# Patient Record
Sex: FEMALE | Race: WHITE
[De-identification: names, ages, dates, MRNs, and addresses within clinical notes are randomized per-mention and may not be internally consistent; named-entity substitution may affect disease eponyms.]

---

## 2020-10-28 ENCOUNTER — HOSPITAL ENCOUNTER (OUTPATIENT)
Dept: HOSPITAL 76 - LAB.R | Age: 35
LOS: 61 days | Discharge: HOME | End: 2020-12-28
Attending: ADVANCED PRACTICE MIDWIFE
Payer: COMMERCIAL

## 2020-10-28 DIAGNOSIS — Z34.90: Primary | ICD-10-CM

## 2020-10-28 LAB
AMPHET UR QL SCN: NEGATIVE
BENZODIAZ UR QL SCN: NEGATIVE
CLARITY UR REFRACT.AUTO: CLEAR
COCAINE UR-SCNC: NEGATIVE UMOL/L
GLUCOSE UR QL STRIP.AUTO: NEGATIVE MG/DL
KETONES UR QL STRIP.AUTO: NEGATIVE MG/DL
METHADONE UR QL SCN: NEGATIVE
METHAMPHET UR QL SCN: NEGATIVE
NITRITE UR QL STRIP.AUTO: NEGATIVE
OPIATES UR QL SCN: NEGATIVE
PH UR STRIP.AUTO: 7.5 PH (ref 5–7.5)
PROT UR STRIP.AUTO-MCNC: NEGATIVE MG/DL
RBC # UR STRIP.AUTO: NEGATIVE /UL
SP GR UR STRIP.AUTO: 1.01 (ref 1–1.03)
SQUAMOUS URNS QL MICRO: (no result)
UROBILINOGEN UR QL STRIP.AUTO: (no result) E.U./DL
UROBILINOGEN UR STRIP.AUTO-MCNC: NEGATIVE MG/DL
VOLATILE DRUGS POS SERPL SCN: (no result)

## 2020-10-28 PROCEDURE — 80306 DRUG TEST PRSMV INSTRMNT: CPT

## 2020-10-28 PROCEDURE — 81001 URINALYSIS AUTO W/SCOPE: CPT

## 2020-10-28 PROCEDURE — 87086 URINE CULTURE/COLONY COUNT: CPT

## 2020-11-11 ENCOUNTER — HOSPITAL ENCOUNTER (OUTPATIENT)
Dept: HOSPITAL 76 - DI | Age: 35
Discharge: HOME | End: 2020-11-11
Attending: ADVANCED PRACTICE MIDWIFE
Payer: COMMERCIAL

## 2020-11-11 DIAGNOSIS — Z34.91: Primary | ICD-10-CM

## 2020-11-11 PROCEDURE — 76801 OB US < 14 WKS SINGLE FETUS: CPT

## 2020-11-11 NOTE — ULTRASOUND REPORT
PROCEDURE:  OB First Trimester

 

INDICATIONS:  SUPERVISION OF NORMALL PREGNANCY

 

OUTSIDE/PRIOR DATING DATA:  

Last menstrual period (LMP): 9/22/2020.  

LMP-based estimated date of delivery (YUE): 6/29/2021.  

First dating scan (date and location): 11/11/2020.  

Estimated date of delivery (YUE) from first dating scan: 6/27/2021.  

 

TECHNIQUE:  

Real-time scanning was performed of the fetus and maternal pelvic organs, with image documentation.  


 

COMPARISON:  None

 

FINDINGS:  There is a single living intrauterine gestation with fetal heart rate 152 bpm.

 

Embryo:  Crown-rump length 1.2 cm correlates with a gestational age estimate of 7 weeks 3 days, +/- 5
 days.

 

Measurement variability in dating:  +/- 4 weeks by LMP, +/- 7 days by mean sac diameter (use before 6
 weeks gestation if crown-rump length not able to be measured), +/- 5 days by crown-rump length (6-12
 weeks gestation).  

 

Maternal organs:  Ovaries normal considering gestational status.  Limited images through the kidneys 
demonstrate no hydronephrosis.  

 

IMPRESSION:  

7 week 3 day gestational age, with delivery date projected to be centered on 6/27/2021 from this stud
y. Follow-up fetal anatomic survey at 20 weeks gestation is recommended.

 

Reviewed by: Deo Adler MD on 11/11/2020 5:14 PM PST

Approved by: Deo Adler MD on 11/11/2020 5:14 PM PST

 

 

Station ID:  [id number]

## 2020-12-02 ENCOUNTER — HOSPITAL ENCOUNTER (OUTPATIENT)
Dept: HOSPITAL 76 - LAB | Age: 35
Discharge: HOME | End: 2020-12-02
Attending: ADVANCED PRACTICE MIDWIFE
Payer: COMMERCIAL

## 2020-12-02 DIAGNOSIS — Z34.90: Primary | ICD-10-CM

## 2020-12-02 DIAGNOSIS — Z36.89: ICD-10-CM

## 2020-12-02 LAB
BASOPHILS NFR BLD AUTO: 0.1 10^3/UL (ref 0–0.1)
BASOPHILS NFR BLD AUTO: 0.6 %
EOSINOPHIL # BLD AUTO: 0.4 10^3/UL (ref 0–0.7)
EOSINOPHIL NFR BLD AUTO: 3.4 %
ERYTHROCYTE [DISTWIDTH] IN BLOOD BY AUTOMATED COUNT: 12.2 % (ref 12–15)
HGB UR QL STRIP: 12.6 G/DL (ref 12–16)
LYMPHOCYTES # SPEC AUTO: 1.5 10^3/UL (ref 1.5–3.5)
LYMPHOCYTES NFR BLD AUTO: 14.2 %
MCH RBC QN AUTO: 30.8 PG (ref 27–31)
MCHC RBC AUTO-ENTMCNC: 33.7 G/DL (ref 32–36)
MCV RBC AUTO: 91.4 FL (ref 81–99)
MONOCYTES # BLD AUTO: 0.7 10^3/UL (ref 0–1)
MONOCYTES NFR BLD AUTO: 6.7 %
NEUTROPHILS # BLD AUTO: 7.7 10^3/UL (ref 1.5–6.6)
NEUTROPHILS # SNV AUTO: 10.3 X10^3/UL (ref 4.8–10.8)
NEUTROPHILS NFR BLD AUTO: 74.7 %
PDW BLD AUTO: 10.2 FL (ref 7.9–10.8)
PLATELET # BLD: 207 10^3/UL (ref 130–450)
RBC MAR: 4.09 10^6/UL (ref 4.2–5.4)

## 2020-12-02 PROCEDURE — 87389 HIV-1 AG W/HIV-1&-2 AB AG IA: CPT

## 2020-12-02 PROCEDURE — 86803 HEPATITIS C AB TEST: CPT

## 2020-12-02 PROCEDURE — 81599 UNLISTED MAAA: CPT

## 2020-12-02 PROCEDURE — 86787 VARICELLA-ZOSTER ANTIBODY: CPT

## 2020-12-02 PROCEDURE — 36415 COLL VENOUS BLD VENIPUNCTURE: CPT

## 2020-12-02 PROCEDURE — 86762 RUBELLA ANTIBODY: CPT

## 2020-12-02 PROCEDURE — 86850 RBC ANTIBODY SCREEN: CPT

## 2020-12-02 PROCEDURE — 85025 COMPLETE CBC W/AUTO DIFF WBC: CPT

## 2020-12-02 PROCEDURE — 86900 BLOOD TYPING SEROLOGIC ABO: CPT

## 2020-12-02 PROCEDURE — 87340 HEPATITIS B SURFACE AG IA: CPT

## 2020-12-02 PROCEDURE — 86901 BLOOD TYPING SEROLOGIC RH(D): CPT

## 2020-12-02 PROCEDURE — 86592 SYPHILIS TEST NON-TREP QUAL: CPT

## 2020-12-03 LAB
HEPATITIS B SURFACE ANTIGEN: (no result)
HEPATITIS C ANTIBODY: (no result)
HIV AG/AB 4TH GEN: (no result)
SIGNAL TO CUT-OFF: 0 (ref ?–1)

## 2021-01-08 ENCOUNTER — HOSPITAL ENCOUNTER (OUTPATIENT)
Dept: HOSPITAL 76 - LAB | Age: 36
Discharge: HOME | End: 2021-01-08
Attending: ADVANCED PRACTICE MIDWIFE
Payer: COMMERCIAL

## 2021-01-08 DIAGNOSIS — O09.519: Primary | ICD-10-CM

## 2021-01-08 PROCEDURE — 36415 COLL VENOUS BLD VENIPUNCTURE: CPT

## 2021-01-08 PROCEDURE — 82105 ALPHA-FETOPROTEIN SERUM: CPT

## 2021-02-01 ENCOUNTER — HOSPITAL ENCOUNTER (OUTPATIENT)
Dept: HOSPITAL 76 - LAB | Age: 36
Discharge: HOME | End: 2021-02-01
Attending: ADVANCED PRACTICE MIDWIFE
Payer: COMMERCIAL

## 2021-02-01 DIAGNOSIS — Z36.0: Primary | ICD-10-CM

## 2021-02-01 PROCEDURE — 82105 ALPHA-FETOPROTEIN SERUM: CPT

## 2021-02-01 PROCEDURE — 81599 UNLISTED MAAA: CPT

## 2021-02-10 ENCOUNTER — HOSPITAL ENCOUNTER (OUTPATIENT)
Dept: HOSPITAL 76 - DI | Age: 36
Discharge: HOME | End: 2021-02-10
Attending: ADVANCED PRACTICE MIDWIFE
Payer: COMMERCIAL

## 2021-02-10 DIAGNOSIS — Z36.0: ICD-10-CM

## 2021-02-10 DIAGNOSIS — Z34.92: Primary | ICD-10-CM

## 2021-02-15 NOTE — ULTRASOUND REPORT
PROCEDURE:  OB Detailed Fetal Eval

 

INDICATIONS:  ENC FOR  SCREENING

 

OUTSIDE/PRIOR DATING DATA:  

Last menstrual period (LMP): 2020.  

LMP-based estimated date of delivery (YUE): 2021.  

First dating scan (date and location): 2020.  

Estimated date of delivery (YUE) from first dating scan: 2021. Due date by provider is 2021
.  

 

TECHNIQUE: 

Real-time scanning was performed of the fetus, with image documentation and biometric measurements.  


 

COMPARISON:  OB ultrasound 2020

 

FINDINGS:     

 

General:  A single living intrauterine gestation is present.  

Presentation:  Third

Placenta:  Placental position is posterior, without previa.  

Amniotic fluid index:  16.3 cm,  within normal limits for gestational age.  Largest pocket 4.5 cm.

Fetal heart rate:  158 beats per minute.  

Maternal cervical canal:  3.6 cm long; normal length is 2.5 cm or more.  

 

Fetal biometrics:  

Biparietal diameter:  5.0 cm 21 weeks 1 day

Head circumference:  18.7 cm 21 weeks 0 days

Abdominal circumference:  15.9 cm 21 weeks 0 days

Femur length:  3.4 cm 20 weeks 5 days

Estimated gestational age from initial scan: 20 weeks 1 day

Composite gestational age from present scan:  20 weeks 6 days

Estimated fetal weight and percentile:  385 g, 85th percentile

Measurement variability in biometric dating: +/- 10 days from 12-20 weeks gestation, +/- 2 weeks from
 20-30 weeks gestation, +/- 3 weeks at 30 weeks gestation or later.  

 

Anatomic survey:  

Neuro:  Ventricles are normal at less than 10 mm.  Cisterna magna is normal at 3-11 mm.  Cerebellum i
s normal in size and morphology.  

Nuchal skin fold:  Normal at less than 6 mm between 14 and 20 weeks gestational age.  

Face:  Nose and lips, facial profile are normal.  

Spine:  No evidence for spina bifida.  

Heart:  4-chambered heart is present. Right ventricular outflow tract is suboptimally evaluated.

Diaphragm:  Diaphragm is intact.  

Stomach:  Left-sided stomach is present.  

Kidneys:  No fetal hydronephrosis.  Normal is less than 5 mm in 2nd trimester, less than 7 mm in 3rd 
trimester.  

Cord:  3 vessel cord has marginal placental cord insertion approximately 1.5 cm from the superior edg
e.

Bladder:  Normal in size.  

Extremities:  All 4 extremities are visualized.  

 

IMPRESSION:  

Single live intrauterine pregnancy as above.

 

Marginal placental cord insertion.

 

Suboptimal visualization of the right ventricular outflow track. Recommend interval follow-up for add
itional evaluation.

 

Reviewed by: Sarah Trevizo MD on 2/15/2021 4:54 PM PST

Approved by: Sarah Trevizo MD on 2/15/2021 4:54 PM PST

 

 

Station ID:  [id number]

## 2021-03-10 ENCOUNTER — HOSPITAL ENCOUNTER (OUTPATIENT)
Dept: HOSPITAL 76 - DI | Age: 36
Discharge: HOME | End: 2021-03-10
Attending: ADVANCED PRACTICE MIDWIFE
Payer: COMMERCIAL

## 2021-03-10 DIAGNOSIS — Z34.92: Primary | ICD-10-CM

## 2021-03-10 NOTE — ULTRASOUND REPORT
PROCEDURE:  OB F/U or Repeat

 

INDICATIONS:  SUPERVISION OF NORMAL PREGNANCY

 

OUTSIDE/PRIOR DATING DATA:  

Last menstrual period (LMP): 9/22/2020.  

LMP-based estimated date of delivery (YUE): 6/29/2021.  

First dating scan (date and location): 11/11/2020.  

Estimated date of delivery (YUE) from first dating scan: 6/27/2021.  

 

TECHNIQUE: 

Real-time scanning was performed of the fetus, with image documentation.  

Endovaginal scanning:  Not performed.

 

COMPARISON:  2/10/2021.

 

FINDINGS:  

 

General:  A single living intrauterine gestation is present.  

Presentation:  Cephalic.

Placenta:  Placental position is posterior, without previa. Cord insertion is marginal approximately 
1.4 cm from the placental edge. 

Amniotic fluid index:  18.4 cm cm, 80th percentile for gestational age.  Largest pocket 5 cm.

Fetal heart rate:  141 beats per minute.  

Maternal cervical canal:  4.4 cm long; normal length is 2.5 cm or more. No funneling. 

 

Estimated gestational age from initial scan: 24 weeks 1 day.  

Composite gestational age from present scan:  Not applicable.

 

Measurement variability in biometric dating: +/- 10 days from 12-20 weeks gestation, +/- 2 weeks from
 20-30 weeks gestation, +/- 3 weeks at 30 weeks gestation or more.  

 

Other: RVOT, LVOT, four-chamber heart views are normal.

 

IMPRESSION:  

1. [place] living intrauterine pregnancy at 24 weeks 1 day based on prior ultrasound. 

 

2. Marginal cord insertion on the placenta similar the prior exam. MARILYN 18.4 cm.

 

3. The fetal heart is now seen and normal in appearance.

 

Reviewed by: Oscar Philippe MD on 3/10/2021 5:38 PM PST

Approved by: Oscar Philippe MD on 3/10/2021 5:38 PM PST

 

 

Station ID:  [id number]

## 2021-03-31 ENCOUNTER — HOSPITAL ENCOUNTER (OUTPATIENT)
Dept: HOSPITAL 76 - LAB | Age: 36
Discharge: HOME | End: 2021-03-31
Attending: ADVANCED PRACTICE MIDWIFE
Payer: COMMERCIAL

## 2021-03-31 DIAGNOSIS — Z36.0: Primary | ICD-10-CM

## 2021-03-31 LAB
ERYTHROCYTE [DISTWIDTH] IN BLOOD BY AUTOMATED COUNT: 13.2 % (ref 12–15)
HCT VFR BLD AUTO: 31.9 % (ref 37–47)
HGB UR QL STRIP: 11 G/DL (ref 12–16)
MCH RBC QN AUTO: 32.4 PG (ref 27–31)
MCHC RBC AUTO-ENTMCNC: 34.5 G/DL (ref 32–36)
MCV RBC AUTO: 94.1 FL (ref 81–99)
NEUTROPHILS # SNV AUTO: 10.1 X10^3/UL (ref 4.8–10.8)
PDW BLD AUTO: 9.6 FL (ref 7.9–10.8)
PLATELET # BLD: 156 10^3/UL (ref 130–450)
RBC MAR: 3.39 10^6/UL (ref 4.2–5.4)

## 2021-03-31 PROCEDURE — 85027 COMPLETE CBC AUTOMATED: CPT

## 2021-03-31 PROCEDURE — 36415 COLL VENOUS BLD VENIPUNCTURE: CPT

## 2021-03-31 PROCEDURE — 82950 GLUCOSE TEST: CPT

## 2021-05-12 ENCOUNTER — HOSPITAL ENCOUNTER (OUTPATIENT)
Dept: HOSPITAL 76 - LAB | Age: 36
Discharge: HOME | End: 2021-05-12
Attending: ADVANCED PRACTICE MIDWIFE
Payer: COMMERCIAL

## 2021-05-12 DIAGNOSIS — O99.019: Primary | ICD-10-CM

## 2021-05-12 LAB
ERYTHROCYTE [DISTWIDTH] IN BLOOD BY AUTOMATED COUNT: 13.4 % (ref 12–15)
HCT VFR BLD AUTO: 33.7 % (ref 37–47)
HGB UR QL STRIP: 11.7 G/DL (ref 12–16)
MCH RBC QN AUTO: 32 PG (ref 27–31)
MCHC RBC AUTO-ENTMCNC: 34.7 G/DL (ref 32–36)
MCV RBC AUTO: 92.1 FL (ref 81–99)
NEUTROPHILS # SNV AUTO: 10.9 X10^3/UL (ref 4.8–10.8)
PDW BLD AUTO: 10.3 FL (ref 7.9–10.8)
PLATELET # BLD: 164 10^3/UL (ref 130–450)
RBC MAR: 3.66 10^6/UL (ref 4.2–5.4)

## 2021-05-12 PROCEDURE — 85027 COMPLETE CBC AUTOMATED: CPT

## 2021-05-12 PROCEDURE — 36415 COLL VENOUS BLD VENIPUNCTURE: CPT

## 2021-05-25 ENCOUNTER — HOSPITAL ENCOUNTER (OUTPATIENT)
Dept: HOSPITAL 76 - WFO | Age: 36
Discharge: HOME | End: 2021-05-25
Attending: ADVANCED PRACTICE MIDWIFE
Payer: COMMERCIAL

## 2021-05-25 VITALS — SYSTOLIC BLOOD PRESSURE: 128 MMHG | DIASTOLIC BLOOD PRESSURE: 87 MMHG

## 2021-05-25 DIAGNOSIS — O36.8330: Primary | ICD-10-CM

## 2021-05-25 DIAGNOSIS — Z3A.35: ICD-10-CM

## 2021-05-25 PROCEDURE — 59025 FETAL NON-STRESS TEST: CPT

## 2021-05-25 PROCEDURE — 99213 OFFICE O/P EST LOW 20 MIN: CPT

## 2021-05-25 NOTE — PROVIDER PROGRESS NOTE
- HPI


Chief Complaint: Other


Current Pregnancy: 


                                                               Current Pregnancy





EDU                              21


Gestation                        35 Weeks and 0 Days


                          1


Para                             0





Vital Signs











Temperature  31.7 C L  21 11:20


 


Heart Rate  87   21 11:20


 


Respiratory Rate  18   21 11:20


 


Blood Pressure  147/82 H  21 11:20


 


O2 Saturation  97   21 11:20




















Temperature  31.7 C L  21 11:20


 


Heart Rate  89   21 11:43


 


Respiratory Rate  18   21 11:43


 


Blood Pressure  128/87 H  21 11:43


 


O2 Saturation  97   21 11:20














- Procedures


OB Procedure Performed: NST


Diagnosis/Indication for NST: Other


NST Procedure: 


NST Procedure





Start Date                       21


Start Time                       11:17


Stop Time                        11:44


Vibroacoustic Stimulation Used   No


Patient States Fetal Movement    Yes











- Plan


Plan: 





Pt directed to present to [facility] from her routine office visit secondary to fetal

tachycardia noted on fetal doppler at the bedside in the office. 





NST performed 2021


NST read 2021


NST reactive. FHR baseline 145, moderate variability, + accels, no decels


No contractions appreciated via tocometry





Pt released home with precautions.


She verbalized understanding and agrees to above plan. She denies further 

questions or concerns at this time.





FINAL ASSESSMENT:


Fetal Tachycardia - resolved

## 2021-06-04 ENCOUNTER — HOSPITAL ENCOUNTER (OUTPATIENT)
Dept: HOSPITAL 76 - LAB.WC | Age: 36
Discharge: HOME | End: 2021-06-04
Attending: ADVANCED PRACTICE MIDWIFE
Payer: COMMERCIAL

## 2021-06-04 DIAGNOSIS — Z36.85: Primary | ICD-10-CM

## 2021-06-04 PROCEDURE — 87797 DETECT AGENT NOS DNA DIR: CPT

## 2021-07-07 ENCOUNTER — HOSPITAL ENCOUNTER (INPATIENT)
Dept: HOSPITAL 76 - WFO | Age: 36
LOS: 1 days | Discharge: HOME | End: 2021-07-08
Attending: ADVANCED PRACTICE MIDWIFE | Admitting: ADVANCED PRACTICE MIDWIFE
Payer: COMMERCIAL

## 2021-07-07 DIAGNOSIS — Z3A.41: ICD-10-CM

## 2021-07-07 DIAGNOSIS — O32.6XX0: ICD-10-CM

## 2021-07-07 LAB
BASOPHILS NFR BLD AUTO: 0.1 10^3/UL (ref 0–0.1)
BASOPHILS NFR BLD AUTO: 0.4 %
EOSINOPHIL # BLD AUTO: 0.1 10^3/UL (ref 0–0.7)
EOSINOPHIL NFR BLD AUTO: 0.5 %
ERYTHROCYTE [DISTWIDTH] IN BLOOD BY AUTOMATED COUNT: 13.2 % (ref 12–15)
HCT VFR BLD AUTO: 37.4 % (ref 37–47)
HGB UR QL STRIP: 13.1 G/DL (ref 12–16)
IGF BP1 VAG QL: POSITIVE
LYMPHOCYTES # SPEC AUTO: 1.5 10^3/UL (ref 1.5–3.5)
LYMPHOCYTES NFR BLD AUTO: 11.9 %
MCH RBC QN AUTO: 32 PG (ref 27–31)
MCHC RBC AUTO-ENTMCNC: 35 G/DL (ref 32–36)
MCV RBC AUTO: 91.2 FL (ref 81–99)
MONOCYTES # BLD AUTO: 0.7 10^3/UL (ref 0–1)
MONOCYTES NFR BLD AUTO: 5.7 %
NEUTROPHILS # BLD AUTO: 10.3 10^3/UL (ref 1.5–6.6)
NEUTROPHILS # SNV AUTO: 12.8 X10^3/UL (ref 4.8–10.8)
NEUTROPHILS NFR BLD AUTO: 80.7 %
NRBC # BLD AUTO: 0 /100WBC
NRBC # BLD AUTO: 0 X10^3/UL
PDW BLD AUTO: 10.6 FL (ref 7.9–10.8)
PLATELET # BLD: 206 10^3/UL (ref 130–450)
RBC MAR: 4.1 10^6/UL (ref 4.2–5.4)

## 2021-07-07 PROCEDURE — 86850 RBC ANTIBODY SCREEN: CPT

## 2021-07-07 PROCEDURE — 84112 EVAL AMNIOTIC FLUID PROTEIN: CPT

## 2021-07-07 PROCEDURE — 86900 BLOOD TYPING SEROLOGIC ABO: CPT

## 2021-07-07 PROCEDURE — 85025 COMPLETE CBC W/AUTO DIFF WBC: CPT

## 2021-07-07 PROCEDURE — 86901 BLOOD TYPING SEROLOGIC RH(D): CPT

## 2021-07-07 PROCEDURE — 0DQR0ZZ REPAIR ANAL SPHINCTER, OPEN APPROACH: ICD-10-PCS | Performed by: ADVANCED PRACTICE MIDWIFE

## 2021-07-07 PROCEDURE — 36415 COLL VENOUS BLD VENIPUNCTURE: CPT

## 2021-07-07 PROCEDURE — 99214 OFFICE O/P EST MOD 30 MIN: CPT

## 2021-07-07 RX ADMIN — ACETAMINOPHEN SCH MG: 500 TABLET ORAL at 17:26

## 2021-07-07 RX ADMIN — IBUPROFEN SCH MG: 600 TABLET, FILM COATED ORAL at 09:13

## 2021-07-07 RX ADMIN — ACETAMINOPHEN SCH MG: 500 TABLET ORAL at 09:13

## 2021-07-07 RX ADMIN — IBUPROFEN SCH MG: 600 TABLET, FILM COATED ORAL at 22:17

## 2021-07-07 RX ADMIN — DOCUSATE SODIUM SCH MG: 100 CAPSULE, LIQUID FILLED ORAL at 20:49

## 2021-07-07 RX ADMIN — DOCUSATE SODIUM SCH MG: 100 CAPSULE, LIQUID FILLED ORAL at 09:13

## 2021-07-07 RX ADMIN — IBUPROFEN SCH MG: 600 TABLET, FILM COATED ORAL at 16:03

## 2021-07-07 NOTE — HISTORY & PHYSICAL EXAMINATION
Prenatal Admit History





- Visit Reason


Visit Reason: Contractions





- Pregnancy


: 1


Parity: 0


Premature: 0


Ectopic: 0


: 0


Prenatal Risk/History: positive: None


Pregnancy Complications This Pregnancy: positive: None


Smoking Status: Never smoker





- Mother's Labs


Mother's Blood Type: positive: O


Mother's RH: positive: Positive


GBS: positive: Group B Step Negative


Rubella Status: positive: Immune





- Other Maternal History


Other Maternal History: 





-37yo  at 41.1wks gestation who presents to Labor and Delivery with reports 

of contractions since 230, which woke her up


-Reports fetal movement


-Denies ctx/VB/LOF


-Prenatal care with [facility] which has been adequate


-Pregnancy Complications


*Mild anemia


*Elderly primigravida


-Dating Criteria


* Initial US: at 7.1wks c/w LMP for YUE 2020:  


-OB Hx


*G1:current


-Medications


*Prenatal vitamin-daily


*Fe supplementation- 325mg daily


-Allergies


*Amoxicillin (critical)


-Medical History


*Anxiety


-Surgical History


*Jarbidge Teeth extraction


-Family History


*MGM- arthritis


*Father- HTN


Mother- MI <64yo


-Social History


*Non contributory





-Prenatal Labs, Immunizations, and Findings


Initial US: at 7.1wks c/w LMP for YUE 2020:  


O pos/Rubella  immune


VZV immune


Gentic testing: [place]- neg;  AFP - neg


FAS:  Posterior placenta. MARILYN wnl. EFW 85%. 3vc. Suboptimal views of outflow 

tracts, 


f/u FAS 03/10/2021 -WNL


Glucola 100


Flu:  10/28/2020


TDAP  2021


GBS at 36.2 weeks -Neg


HSV:  denies self and partner


Breast pump Rx 2021


MOD: . Anxiety about birth noted- books recommended; Desires "wait and see" 

approach to pain management; FOB: Yovani. It's a BOY! Ron


pp contraception: condoms


PAP: 2020- nilm, hpv neg, GC/CT neg





-SVE per RN : complete/plus one


-Vertex by digital exam


-EFW by leopold's approx. 8.5#


- Physical Exam: 


Normocephalic, atraumatic


Heart RRR w/o murmur


Lungs clear and equal bilaterally


Abdomen gravid, soft, nontender


Edema- none


Psych- wnl





-FHTs per flowsheet





-Assessment


*Restate STATUS


*Heaton Score *- requires cervical ripening


*Fetal Heart Tones- Category I





-Plan


*Admit to OBS(until active labor, SROM, AROM, epidural, or pitocin)


*Cervical ripening with Misoprostol


*Monitoring- Continuous


*Comfort measures available- position changes, whirlpool tub, fentanyl, and 

epidural per maternal preference


*Diet/Activity- per maternal preference


*Anticipate 





Meds/Allgy





- Allergies


Allergies/Adverse Reactions: 


                                    Allergies











Allergy/AdvReac Type Severity Reaction Status Date / Time


 


amoxicillin Allergy  Anaphylaxis Verified 21 05:43














Physical





- Abdominal Exam


Contraction Frequency (min/apart): 1-3min


Contraction Intensity: positive: Strong


Uterine Resting Tone: positive: Soft





- Fetal Monitoring


Fetal Heart Rate Baseline: 140


Fetal Strip Review: positive: Category I





- Presentation


Presentation: positive: Vertex





- Vaginal Exam


Dilation (in cm): 10





Plan for Labor





- Plan For Labor


I expect patient to be DC'd or transferred within 96 hours.: Yes

## 2021-07-07 NOTE — DELIVERY NOTE
<Val Casanova - Last Filed: 21 08:37>





Delivery Note





- Delivery Comments (Free Text/Narrative)


Delivery Comments (Free Text/Narrative): 














ADDENDUM: 


I was called in for consultation for complication perineal laceration repair by 

KIP Mccain. Patient was examined and found to have partial third

degree laceration. She was administered 25 cc of 1% lidocaine in divided doses 

through the course of the repair. A rectal exam was performed and the anal 

sphincter was noted to be lacerated to about 2/3 depth. Interrupted sutures 

using 2-0 Vicryl were used to reinforce the sphincter in the posterior, 

inferior, superior, and anterior positions. Rectal exam was performed and the 

bulk of the sphincter muscle was adequately developed and no sutures were 

palpated in the rectum. Interrupted suture again using 2-0 Vicryl was used to 

reapproximate the musculature overlying the rectovaginal septum. Rectal exam was

again performed, which showed absence of suture in the rectal vault and 

indicated satisfactory bulk over the rectovaginal septum. A crown stitch using 

3-0 Vicryl  was used to repair the floor of the vaginal vault and the perineum 

to the edge of the anal verge, performed in the usual sterile fashion in layers.

Final rectal exam was performed and again showed absence of suture in the rectal

vault. Good hemostasis was noted. 





Please refer to the delivery note above submitted by KIP Mccain,

for details regarding preceding delivery and EBL. 





Bee Casanova MD  21  08:30





<Orquidea Kearney - Last Filed: 21 14:20>





Delivery Note





- Labor


Labor: positive: Spontaneous





- Infant Delivery Method


Infant Delivery Method: positive: Spontaneous vaginal delivery





- Birth Presentation


Birth Presentation: positive: Vertex, NICHLELE - right occiput anterior (right hand 

compound)





- Nuchal Cord


Nuchal Cord: positive: None





- Amniotic Fluid Description


Amniotic Fluid Description: positive: Clear





- Episiotomy Type


Episiotomy Type: positive: None





- Laceration


Laceration: positive: 3rd degree





- Red Jacket


: positive: Placed in direct skin contact with mother, Stimulated, Blank

et used


Red Jacket sex: positive: Male





- Cord


Cord: positive: 3 vessels





- Placenta


Placenta: positive: Intact, Spontaneous





- Estimated Blood Loss


Estimated Blood Loss (in cc): 300





- Delivery Comments (Free Text/Narrative)


Delivery Comments (Free Text/Narrative): 


Birth Note:


Labor: This 36 year old, , @41.1wks gestation by 7.1 week Ultrasound, 

confirmed by LMP, presented @ 0505 in active labor. Cervix was complete, plus 

one, and vertex.  FHR pattern demonstrated a moderate baseline in a Category I 

pattern. Normal labor course. SROM occurred @ 0455 and amount and color of fluid

were noted to be moderate and clear. CNM to bedside by 0520 and pushing began. 

Forebag ruptured for clear at 0558. 


Birth: Normal  of a 4055gm male infant, named Ron, on 21 @ 0701. 

Nuchal not present, however baby did present in NICHELLE with a right hand compound 

presentation. The  was placed on maternal abdomen, stimulated, dried and 

placed skin to skin. Apgars 8 at one minute and 9 at five minutes. The umbilical

cord was allowed to stop pulsating at which time it was doubly clamped by CNM 

and cut by Valery. Pitocin administered via IV for hemostasis. Fundal massage 

and gentle cord traction applied for active third stage management. Cord blood 

was obtained. Placenta delivered spontaneously and intact at 0713. Three vessel 

cord. EBL 300mL.


Fourth Stage:  Uterine fundus firm and without excessive bleeding. The perineum,

vagina, and cervix were inspected and found to be have sustained a partial third

degree laceration. MD backup called to bedside for assistance with repair. 

Please see note.


Following repair: Tissues well approximated. Breastfeeding initiated. Family 

bonding well. Both mother and baby are in stable condition.

## 2021-07-08 VITALS — DIASTOLIC BLOOD PRESSURE: 69 MMHG | SYSTOLIC BLOOD PRESSURE: 120 MMHG

## 2021-07-08 RX ADMIN — ACETAMINOPHEN SCH MG: 500 TABLET ORAL at 01:30

## 2021-07-08 RX ADMIN — IBUPROFEN SCH MG: 600 TABLET, FILM COATED ORAL at 04:18

## 2021-07-08 RX ADMIN — DOCUSATE SODIUM SCH MG: 100 CAPSULE, LIQUID FILLED ORAL at 09:36

## 2021-07-08 RX ADMIN — IBUPROFEN SCH MG: 600 TABLET, FILM COATED ORAL at 10:28

## 2021-07-08 RX ADMIN — ACETAMINOPHEN SCH MG: 500 TABLET ORAL at 09:36

## 2021-07-08 NOTE — DISCHARGE PLAN
Discharge Plan


Problem Reviewed?: Yes


Disposition: 01 Home, Self Care


Condition: Good


Diet: Regular


No Smoking: If you smoke, Please STOP!  Call [phone] for help.


Follow-up with: 


Orquidea Kearney ARNP [Provider Admit Priv/Credential] -

## 2021-07-08 NOTE — DISCHARGE SUMMARY
Discharge Summary


Admit Date: 21


Discharge Date: 21


Condition at Discharge: Good


Discharge Disposition: 01 Home, Self Care





- HPI


History of Present Illness: 





Admit Date 2021


Discharge Date 2021


Diagnosis on Admission:


1.   A 35yo  at 31.1 week intrauterine pregnancy


2.   Anemia of pregnancy- mild


3.   Elderly primagravida








Diagnosis on Discharge


1.   A 35yo  s/p spontaneous vaginal delivery on 2021


2.   Normal recovery








Brief History:


She is a patient at [facility] who presented to labor and 

delivery on 2021 @41.1wks gestation by 7.1 week Ultrasound, confirmed by 

LMP, presented @ 0505 in active labor. Cervix was complete, plus one, and 

vertex.  FHR pattern demonstrated a moderate baseline in a Category I pattern. 

Normal labor course. SROM occurred @ 0455 and amount and color of fluid were 

noted to be moderate and clear. CNM to bedside by 0520 and pushing began. 

Forebag ruptured for clear at 0558. 


Normal  of a 4055gm male infant, named Ron, on 21 @ 0701. Apgars were

8 and 9- and 1 and 5 minutes respectively. EBL 300mL. She sustained partial 

third degree perineal laceration which was repaired by MD.





She has been doing well in her postpartum course. She is ambulating and 

tolerating a regular diet. She is urinating without difficulty and her lochia is

normal. Her pain is well controlled with oral medications. She will be discha

rged home today on postpartum day #1  without need for. She intends to follow up

with Midwifery at [facility] in 1, and 6 weeks for routine 

postpartum visit. She has been given precautions to call if she has any 

worsening fevers, chills, abdominal pain, increased bleeding or foul smelling 

vaginal lochia.





- ALLERGIES


Allergies/Adverse Reactions: 


                                    Allergies











Allergy/AdvReac Type Severity Reaction Status Date / Time


 


amoxicillin Allergy  Anaphylaxis Verified 21 05:43














- LABS


Result Diagrams: 


                                 21 05:20

## 2021-07-08 NOTE — PROVIDER PROGRESS NOTE
Subjective





- Prog Note Date


Prog Note Date: 21


Prog Note Time: 07:00





- Subjective


Pt reports feeling: Improved


Subjective: 





S:


Valery is resting in bed. Baby is in bassinet and FOB is supportive at bedside.


She reports breastfeeding has not been too much of a struggle.


She feels her pain is well managed with oral medication and has not stooled yet,

but is aware to take her time.


O


Lochia rubra


fundus firm


A:


35yo  s/p SVE on 2021


Normal recovery


P:


Continue routine postpartum care


Evaluate for discharge home today





Objective





- Vital Signs/Intake & Output


Vital Signs: 


                                Vital Signs x48h











  Temp Pulse Resp BP Pulse Ox


 


 21 04:14  36.7 C  75  18  131/76 H  97











Intake & Output: 


                                 Intake & Output











 21





 23:59 23:59 23:59 23:59


 


Intake Total   1229.75 500


 


Output Total   650 


 


Balance   579.75 500














- Lab Results


Fish Bones: 


                                 21 05:20

## 2023-02-08 ENCOUNTER — HOSPITAL ENCOUNTER (OUTPATIENT)
Dept: HOSPITAL 76 - LAB.S | Age: 38
Discharge: HOME | End: 2023-02-08
Attending: ADVANCED PRACTICE MIDWIFE
Payer: COMMERCIAL

## 2023-02-08 DIAGNOSIS — O99.210: Primary | ICD-10-CM

## 2023-02-08 DIAGNOSIS — Z13.79: ICD-10-CM

## 2023-02-08 LAB
BASOPHILS NFR BLD AUTO: 0.1 10^3/UL (ref 0–0.1)
BASOPHILS NFR BLD AUTO: 0.5 %
EOSINOPHIL # BLD AUTO: 0.4 10^3/UL (ref 0–0.7)
EOSINOPHIL NFR BLD AUTO: 3.6 %
ERYTHROCYTE [DISTWIDTH] IN BLOOD BY AUTOMATED COUNT: 12.9 % (ref 12–15)
EST. AVERAGE GLUCOSE BLD GHB EST-MCNC: 97 MG/DL (ref 70–100)
HBA1C MFR BLD HPLC: 5 % (ref 4.27–6.07)
HCT VFR BLD AUTO: 37 % (ref 37–47)
HGB UR QL STRIP: 12.3 G/DL (ref 12–16)
LYMPHOCYTES # SPEC AUTO: 1.7 10^3/UL (ref 1.5–3.5)
LYMPHOCYTES NFR BLD AUTO: 16.8 %
MCH RBC QN AUTO: 30.1 PG (ref 27–31)
MCHC RBC AUTO-ENTMCNC: 33.2 G/DL (ref 32–36)
MCV RBC AUTO: 90.7 FL (ref 81–99)
MONOCYTES # BLD AUTO: 0.6 10^3/UL (ref 0–1)
MONOCYTES NFR BLD AUTO: 5.7 %
NEUTROPHILS # BLD AUTO: 7.4 10^3/UL (ref 1.5–6.6)
NEUTROPHILS # SNV AUTO: 10.1 X10^3/UL (ref 4.8–10.8)
NEUTROPHILS NFR BLD AUTO: 73.2 %
NRBC # BLD AUTO: 0 /100WBC
NRBC # BLD AUTO: 0 X10^3/UL
PDW BLD AUTO: 11 FL (ref 7.9–10.8)
PLATELET # BLD: 203 10^3/UL (ref 130–450)
RBC MAR: 4.08 10^6/UL (ref 4.2–5.4)

## 2023-02-08 PROCEDURE — 83036 HEMOGLOBIN GLYCOSYLATED A1C: CPT

## 2023-02-08 PROCEDURE — 86762 RUBELLA ANTIBODY: CPT

## 2023-02-08 PROCEDURE — 86592 SYPHILIS TEST NON-TREP QUAL: CPT

## 2023-02-08 PROCEDURE — 86900 BLOOD TYPING SEROLOGIC ABO: CPT

## 2023-02-08 PROCEDURE — 87340 HEPATITIS B SURFACE AG IA: CPT

## 2023-02-08 PROCEDURE — 85025 COMPLETE CBC W/AUTO DIFF WBC: CPT

## 2023-02-08 PROCEDURE — 86803 HEPATITIS C AB TEST: CPT

## 2023-02-08 PROCEDURE — 86787 VARICELLA-ZOSTER ANTIBODY: CPT

## 2023-02-08 PROCEDURE — 87389 HIV-1 AG W/HIV-1&-2 AB AG IA: CPT

## 2023-02-08 PROCEDURE — 86901 BLOOD TYPING SEROLOGIC RH(D): CPT

## 2023-02-08 PROCEDURE — 86850 RBC ANTIBODY SCREEN: CPT

## 2023-02-08 PROCEDURE — 36415 COLL VENOUS BLD VENIPUNCTURE: CPT

## 2023-02-09 LAB
HCV AB S/CO SERPL IA: <0.1 S/CO RATIO (ref 0–0.9)
VZV IGG SER IA-ACNC: 748 INDEX

## 2023-04-17 ENCOUNTER — HOSPITAL ENCOUNTER (OUTPATIENT)
Dept: HOSPITAL 76 - DI | Age: 38
Discharge: HOME | End: 2023-04-17
Attending: ADVANCED PRACTICE MIDWIFE
Payer: COMMERCIAL

## 2023-04-17 DIAGNOSIS — Z34.02: Primary | ICD-10-CM

## 2023-04-17 DIAGNOSIS — Z36.89: ICD-10-CM

## 2023-04-17 NOTE — ULTRASOUND REPORT
PROCEDURE:  OB Detailed Fetal Eval

 

INDICATIONS:  SUPERVISION OF PREGNANCY

 

OUTSIDE/PRIOR DATING DATA:  

Last menstrual period (LMP): 11/26/2022.  

LMP-based estimated date of delivery (YUE): 9/2/2023.  

First dating scan (date and location): To 723.  

Estimated date of delivery (YUE) from first dating scan: 8/31/2023.  

The below data below was generated using the ultrasound YUE of 8/31/2023

 

TECHNIQUE: 

Real-time scanning was performed of the fetus, with image documentation and biometric measurements.  


 

 

COMPARISON:  None

 

FINDINGS:

 

General:  A single living intrauterine gestation is present.  

Presentation:  Vertex

Placenta:  Placental position is posterior, without previa.  

Amniotic fluid index:  17 cm,  within normal limits for gestational age.  

Fetal heart rate:  132 beats per minute.  

Maternal cervical canal:  4.2 cm long; normal length is 2.5 cm or more.  

 

Fetal biometrics:  

Biparietal diameter:  4.9 cm 20 weeks 5 days

Head circumference:  18.5 cm 20 weeks 6 days

Abdominal circumference:  16.1 cm 21 weeks 1 day

Femur length:  3.5 cm 21 weeks 1 day

Estimated gestational age from initial scan: 20 weeks 4 days

Composite gestational age from present scan:  20 weeks 4 days

Estimated fetal weight and percentile:  402 g, 76th percentile

Measurement variability in biometric dating: +/- 10 days from 12-20 weeks gestation, +/- 2 weeks from
 20-30 weeks gestation, +/- 3 weeks at 30 weeks gestation or later.  

 

Anatomic survey:  

Neuro:  Ventricles are normal at less than 10 mm.  Cisterna magna is normal at 3-11 mm.  Cerebellum i
s normal in size and morphology.  

Nuchal skin fold:  Normal at less than 6 mm between 14 and 20 weeks gestational age.  

Face:  Nose and lips, facial profile are not well seen.

Spine:  No evidence for spina bifida.  

Heart:  4-chambered heart and ventricular outflow tracts are suboptimally evaluated.  

Diaphragm:  Diaphragm is intact.  

Stomach:  Left-sided stomach is present.  

Kidneys:  No fetal hydronephrosis.  Normal is less than 5 mm in 2nd trimester, less than 7 mm in 3rd 
trimester.  

Cord:  3 vessel cord has orthotopic insertion.  

Bladder:  Normal in size.  

Extremities:  All 4 extremities are visualized.  

 

IMPRESSION:  

Single live intrauterine pregnancy with ultrasound gestational age of 20 weeks 4 days.

 

4 chambered heart/outflow tracts and profile are not well seen. Recommend follow-up imaging for furth
er evaluation.

 

Reviewed by: Sarah Trevizo MD on 4/17/2023 5:43 PM PDT

Approved by: Sarah Trevizo MD on 4/17/2023 5:43 PM PDT

 

 

Station ID:  [id number]

## 2023-05-04 ENCOUNTER — HOSPITAL ENCOUNTER (OUTPATIENT)
Dept: HOSPITAL 76 - DI | Age: 38
Discharge: HOME | End: 2023-05-04
Attending: ADVANCED PRACTICE MIDWIFE
Payer: COMMERCIAL

## 2023-05-04 DIAGNOSIS — Z36.89: ICD-10-CM

## 2023-05-04 DIAGNOSIS — Z34.00: Primary | ICD-10-CM

## 2023-05-05 NOTE — ULTRASOUND REPORT
PROCEDURE:  OB F/U or Repeat

 

INDICATIONS:  SUPERVISION OF NORMAL PREGNANCY, FAS F/U

 

OUTSIDE/PRIOR DATING DATA:  

Last menstrual period (LMP): 11/26/2022. 

LMP-based estimated date of delivery (YUE): 9/2/2023. 

First dating scan (date and location): To 723. 

Estimated date of delivery (YUE) from first dating scan: 8/31/2023. 

The below data below was generated using the ultrasound YUE of 8/31/2023.

 

TECHNIQUE: 

Real-time scanning was performed of the fetus, with image documentation and biometric measurements.  


 

 

COMPARISON:  OB ultrasound, 4/17/2023.

 

FINDINGS:  

 

General:  A single living intrauterine gestation is present.  

Presentation:  Breech

Placenta:  Placental position is posterior, without previa.  

Amniotic fluid index:  13.3 cm; largest pocket measuring 3.9 cm.  

Fetal heart rate: 137 beats per minute.  

Maternal cervical canal:  4.5 cm long; normal length is 2.5 cm or more.  

 

Other: Fetal facial profile is visualized and appeara normal. Normal four-chamber heart and ventricul
ar outflow tracts.  

 

IMPRESSION:  

 

1. Single living intrauterine gestation redemonstrated.

2. Normal fetal facial follow-up, four-chamber heart and ventricular outflow tracts.

3. Normal MARILYN.

 

Reviewed by: Efrem García MD on 5/5/2023 2:39 PM PDT

Approved by: Efrem García MD on 5/5/2023 2:39 PM PDT

 

 

Station ID:  [id number]

## 2023-05-31 ENCOUNTER — HOSPITAL ENCOUNTER (OUTPATIENT)
Dept: HOSPITAL 76 - LAB.S | Age: 38
Discharge: HOME | End: 2023-05-31
Attending: ADVANCED PRACTICE MIDWIFE
Payer: COMMERCIAL

## 2023-05-31 DIAGNOSIS — Z36.9: Primary | ICD-10-CM

## 2023-05-31 LAB
ERYTHROCYTE [DISTWIDTH] IN BLOOD BY AUTOMATED COUNT: 13.3 % (ref 12–15)
HCT VFR BLD AUTO: 33.4 % (ref 37–47)
HGB UR QL STRIP: 10.8 G/DL (ref 12–16)
MCH RBC QN AUTO: 30.9 PG (ref 27–31)
MCHC RBC AUTO-ENTMCNC: 32.3 G/DL (ref 32–36)
MCV RBC AUTO: 95.4 FL (ref 81–99)
NEUTROPHILS # SNV AUTO: 9.9 X10^3/UL (ref 4.8–10.8)
PDW BLD AUTO: 11.4 FL (ref 7.9–10.8)
PLATELET # BLD: 169 10^3/UL (ref 130–450)
RBC MAR: 3.5 10^6/UL (ref 4.2–5.4)

## 2023-05-31 PROCEDURE — 85027 COMPLETE CBC AUTOMATED: CPT

## 2023-05-31 PROCEDURE — 85025 COMPLETE CBC W/AUTO DIFF WBC: CPT

## 2023-05-31 PROCEDURE — 82950 GLUCOSE TEST: CPT

## 2023-05-31 PROCEDURE — 36415 COLL VENOUS BLD VENIPUNCTURE: CPT

## 2023-08-09 ENCOUNTER — HOSPITAL ENCOUNTER (OUTPATIENT)
Dept: HOSPITAL 76 - LAB.S | Age: 38
Discharge: HOME | End: 2023-08-09
Attending: ADVANCED PRACTICE MIDWIFE
Payer: COMMERCIAL

## 2023-08-09 DIAGNOSIS — R03.0: Primary | ICD-10-CM

## 2023-08-09 LAB
ALBUMIN DIAFP-MCNC: 3.5 G/DL (ref 3.2–5.5)
ALBUMIN/GLOB SERPL: 1.3 {RATIO} (ref 1–2.2)
ALP SERPL-CCNC: 98 IU/L (ref 42–121)
ALT SERPL W P-5'-P-CCNC: 9 IU/L (ref 10–60)
ANION GAP SERPL CALCULATED.4IONS-SCNC: 4 MMOL/L (ref 6–13)
AST SERPL W P-5'-P-CCNC: 12 IU/L (ref 10–42)
BASOPHILS NFR BLD AUTO: 0 10^3/UL (ref 0–0.1)
BASOPHILS NFR BLD AUTO: 0.4 %
BILIRUB BLD-MCNC: 0.4 MG/DL (ref 0.2–1)
BUN SERPL-MCNC: 7 MG/DL (ref 6–20)
CALCIUM UR-MCNC: 9.5 MG/DL (ref 8.5–10.3)
CHLORIDE SERPL-SCNC: 105 MMOL/L (ref 101–111)
CO2 SERPL-SCNC: 25 MMOL/L (ref 21–32)
CREAT SERPLBLD-SCNC: 0.6 MG/DL (ref 0.6–1.3)
CREAT UR-SCNC: 35.6 MG/DL
EOSINOPHIL # BLD AUTO: 0.2 10^3/UL (ref 0–0.7)
EOSINOPHIL NFR BLD AUTO: 1.9 %
ERYTHROCYTE [DISTWIDTH] IN BLOOD BY AUTOMATED COUNT: 13.9 % (ref 12–15)
GFRSERPLBLD MDRD-ARVRAT: 112 ML/MIN/{1.73_M2} (ref 89–?)
GLOBULIN SER-MCNC: 2.7 G/DL (ref 2.1–4.2)
GLUCOSE SERPL-MCNC: 92 MG/DL (ref 74–104)
HCT VFR BLD AUTO: 37 % (ref 37–47)
HGB UR QL STRIP: 12.3 G/DL (ref 12–16)
LYMPHOCYTES # SPEC AUTO: 1.4 10^3/UL (ref 1.5–3.5)
LYMPHOCYTES NFR BLD AUTO: 14 %
MCH RBC QN AUTO: 31.2 PG (ref 27–31)
MCHC RBC AUTO-ENTMCNC: 33.2 G/DL (ref 32–36)
MCV RBC AUTO: 93.9 FL (ref 81–99)
MONOCYTES # BLD AUTO: 0.8 10^3/UL (ref 0–1)
MONOCYTES NFR BLD AUTO: 8 %
NEUTROPHILS # BLD AUTO: 7.4 10^3/UL (ref 1.5–6.6)
NEUTROPHILS # SNV AUTO: 9.9 X10^3/UL (ref 4.8–10.8)
NEUTROPHILS NFR BLD AUTO: 75.1 %
NRBC # BLD AUTO: 0 /100WBC
NRBC # BLD AUTO: 0 X10^3/UL
PDW BLD AUTO: 11.4 FL (ref 7.9–10.8)
PLATELET # BLD: 191 10^3/UL (ref 130–450)
POTASSIUM SERPL-SCNC: 4.1 MMOL/L (ref 3.5–4.5)
PROT 24H UR-MCNC: 4 MG/DL
PROT SPEC-MCNC: 6.2 G/DL (ref 6.4–8.9)
RBC MAR: 3.94 10^6/UL (ref 4.2–5.4)
SODIUM SERPLBLD-SCNC: 134 MMOL/L (ref 135–145)

## 2023-08-09 PROCEDURE — 80053 COMPREHEN METABOLIC PANEL: CPT

## 2023-08-09 PROCEDURE — 82570 ASSAY OF URINE CREATININE: CPT

## 2023-08-09 PROCEDURE — 85025 COMPLETE CBC W/AUTO DIFF WBC: CPT

## 2023-08-09 PROCEDURE — 84156 ASSAY OF PROTEIN URINE: CPT

## 2023-08-09 PROCEDURE — 36415 COLL VENOUS BLD VENIPUNCTURE: CPT

## 2023-08-31 ENCOUNTER — HOSPITAL ENCOUNTER (INPATIENT)
Dept: HOSPITAL 76 - WFO | Age: 38
LOS: 1 days | Discharge: HOME | End: 2023-09-01
Attending: ADVANCED PRACTICE MIDWIFE | Admitting: ADVANCED PRACTICE MIDWIFE
Payer: COMMERCIAL

## 2023-08-31 VITALS — OXYGEN SATURATION: 100 %

## 2023-08-31 DIAGNOSIS — Z3A.39: ICD-10-CM

## 2023-08-31 DIAGNOSIS — Z87.59: ICD-10-CM

## 2023-08-31 PROCEDURE — 0HQ9XZZ REPAIR PERINEUM SKIN, EXTERNAL APPROACH: ICD-10-PCS | Performed by: ADVANCED PRACTICE MIDWIFE

## 2023-08-31 PROCEDURE — 85025 COMPLETE CBC W/AUTO DIFF WBC: CPT

## 2023-08-31 PROCEDURE — 80053 COMPREHEN METABOLIC PANEL: CPT

## 2023-08-31 PROCEDURE — 99215 OFFICE O/P EST HI 40 MIN: CPT

## 2023-08-31 PROCEDURE — 84443 ASSAY THYROID STIM HORMONE: CPT

## 2023-08-31 PROCEDURE — 86850 RBC ANTIBODY SCREEN: CPT

## 2023-08-31 PROCEDURE — 86900 BLOOD TYPING SEROLOGIC ABO: CPT

## 2023-08-31 PROCEDURE — 86901 BLOOD TYPING SEROLOGIC RH(D): CPT

## 2023-08-31 RX ADMIN — IBUPROFEN SCH MG: 800 TABLET, FILM COATED ORAL at 23:18

## 2023-08-31 RX ADMIN — DOCUSATE SODIUM SCH MG: 100 CAPSULE, LIQUID FILLED ORAL at 21:41

## 2023-08-31 RX ADMIN — ACETAMINOPHEN SCH MG: 500 TABLET ORAL at 17:20

## 2023-08-31 RX ADMIN — IBUPROFEN SCH MG: 800 TABLET, FILM COATED ORAL at 17:20

## 2023-08-31 NOTE — EXTERNAL MEDICAL SUMMARY RPT
Continuity of Care Document



                           Created on: 2023





Valery Crockett

External Reference #: [id number]

: 1985

Sex: Female



Demographics





                                        Address             [address] [place] [address]  [address]

 

                                        Phone               Unavailable

 

                                        Preferred Language  English

 

                                        Marital Status      

 

                                        Yazdanism Affiliation Unknown

 

                                        Race                White

 

                                        Ethnic Group        Not  or Lati

no





Author





                                        Name                Unknown

 

                                        [place]  [address]

 

                                        Phone               [phone]

 

                                        Organization        [employer]

 

                                        [place]  [address]

 

                                        Phone               [phone]





Care Team Providers





                                Care Team Member Name Role            Phone

 

                                Unavailable     Unavailable     Unavailable







Results/Labs





                    test      date      facility  value     unit      notes

## 2023-08-31 NOTE — DELIVERY NOTE
Delivery Note





- Labor


Labor: positive: Spontaneous





- Infant Delivery Method


Infant Delivery Method: positive: Spontaneous vaginal delivery





- Birth Presentation


Birth Presentation: positive: Vertex, NICHELLE - right occiput anterior





- Nuchal Cord


Nuchal Cord: positive: None





- Amniotic Fluid Description


Amniotic Fluid Description: positive: Clear





- Episiotomy Type


Episiotomy Type: positive: None





- Laceration


Laceration: positive: 1st degree, Perineal





- Suture


Suture Type: positive: Vicryl


Suture Size: positive: 2-0





- Delivery Outcome


Delivery Outcome: positive: Livebirth





- [place]


: positive: Placed in direct skin contact with mother, Bulb syringe, 

Stimulated, Warmed, [place] used, Warmer used


 sex: positive: Female





- Cord


Cord: positive: 3 vessels





- Placenta


Placenta: positive: Intact, Spontaneous





- Estimated Blood Loss


Estimated Blood Loss (in cc): 250





- Post Delivery Events


Post Delivery Events: positive: No post delivery events





- Delivery Comments (Free Text/Narrative)


Delivery Comments (Free Text/Narrative): 





Labor: This 39yo  @ 39.5wks gestation by LMP c/w 10.5wk U/S presented on 

2023 in active labor. Cervix was 8/100/0 and vertex with intact membranes.

FHR pattern demonstrated Category I pattern throughout labor. Normal labor 

course. SROM occurred at 1432 and was noted to be a moderate amount of clear 

fluid. Pt progressed to c/c/+1 with onset of active pushing at 1437.


Birth: Normal SVB of viable female infant on 2023 @ 1443. No nuchal cord. 

The  was placed on maternal abdomen, stimulated, dried, and placed skin 

to skin. Apgars were 8 and 9 at 1 and 5 minutes respectively. 


Infant moved to infant warmer for closer assessment secondary to poor color at 1

minute of life. (see pediatric note for detailed report). Pitocin administered 

via IV for hemostasis. The umbilical cord was allowed to stop pulsating at which

time it was doubly clamped by CNM and cut by FOB. Cord blood was obtained. 3VC. 

Fundal massage and gentle cord traction applied for active management of the 

third stage. Placenta delivered spontaneously and intact at 1446. EBL 250mL.


Fourth stage: Uterine fundus firm and there is no excessive bleeding. The 

perineum, vagina, and cervix were inspected and found to have a 1st degree 

perineal laceration which was repaired using a 2-0 vicryl on a CT-1 needle in 

standard fashion and under sterile conditions. Vaginal examination following 

repair was performed. Tissues well approximated. Family bonding well. Both 

mother and baby were left in stable condition.

## 2023-08-31 NOTE — HISTORY & PHYSICAL EXAMINATION
Prenatal Admit History





- Visit Reason


Visit Reason: Contractions





- Pregnancy


: 2


Parity: 1


Premature: 0


Ectopic: 0


: 0


Prenatal Care: positive: [facility]


Prenatal Risk/History: positive: None


Pregnancy Complications This Pregnancy: positive: None


Smoking Status: Never smoker





- Mother's Labs


Mother's Blood Type: positive: O


Mother's RH: positive: Positive


GBS: positive: Group B Step Negative


Rubella Status: positive: Immune





- HPI


Diagnosis/Indication for NST: Other





                                                               Current Pregnancy





EDU                              23


Gestation                        39 Weeks and 5 Days


                          2





Vital Signs











Temperature  36.7 C   23 13:34


 


Heart Rate  110 H  23 13:34


 


Respiratory Rate  16   23 13:34


 


Blood Pressure  129/98 H  23 13:34




















Temperature  36.8 C   23 14:01


 


Heart Rate  91   23 14:01


 


Respiratory Rate  18   23 14:01


 


Blood Pressure  139/88 H  23 14:01


 


O2 Saturation      


 


If not protocol: Oxygen Flow, liters/minute      














- NST Procedure





NST Procedure





Start Time                       11:17


Stop Time                        11:44











- Results and Plan


Plan: 





NST reactive. FHR baseline 150s, moderate variability, + accels, no decels


Contractions palpate strong every 2-3 minutes with soft resting tone





Meds/Allgy





- Allergies


Allergies/Adverse Reactions: 


                                    Allergies











Allergy/AdvReac Type Severity Reaction Status Date / Time


 


amoxicillin Allergy  Anaphylaxis Verified 21 05:43














Review of Systems





- Constitutional


Constitutional: denies: Fatigue, Fever, Chills, Malaise





- Eyes


Eyes: denies: Blurred vision, Spots in vision, Dipolpia





- Cardiovascular


Cariovascular: denies: Irregular heart rate, Palpitations, Chest pain, Edema





- Integumentary


Integumentary: denies: Rash, Pruritis





- Neurological


Neurological: denies: Headache





- Psychiatric


Psychiatric: denies: Depression, Anxiety





- Hematologic/Lymphatic


Hematologic/Lymphatic: denies: Anemia





Physical





- Abdominal Exam


Vital Signs: 





                                        











Temp Pulse Resp BP Pulse Ox O2 Flow Rate


 


 36.8 C   91   18   139/88 H      


 


 23 14:01  23 14:01  23 14:01  23 14:01      











Contraction Frequency (min/apart): 2-3


Contraction Intensity: positive: Strong


Uterine Resting Tone: positive: Soft





- Fetal Monitoring


Fetal Heart Rate Baseline: 150


Fetal Strip Review: positive: Category I





- Presentation


Presentation: positive: Vertex





- Vaginal Exam


Membranes: positive: Membranes intact


Dilation (in cm): 8


Effacement (%): 100


Station: positive: 0


Cervical Position: positive: Midposition





- Speculum Exam


Speculum Exam Performed: positive: No





Plan for Labor





- Plan For Labor


I expect patient to be DC'd or transferred within 96 hours.: Yes


Plan for Labor: 





HPI: Valery is a 39yo  @ 39.5wks gestation by LMP c/w 10.5wk U/S who 

presents to [facility] with c/o contractions. Upon arrival her cervix is noted to be 

8/100/0 and vertex with intact membranes. She is found to contract every 2-3 

minutes with soft resting tone. FHR baseline 150s, moderate variability, + 

accels, no decels. She reports light pink vaginal discharge after starting mild,

infrequent contractions this morning at approximately 0730. They increased in 

frequency and intensity since that time. She denies leakage of fluid and she 

reports +FM.


She has been a patient of [facility] for the duration of her 

pregnancy. She is advanced maternal age and her genetic screening was negative. 

She was noted to have moderate anemia at 28wks gestation and received an IV oral

iron infusion x 1 and has been taking a daily oral iron supplement since that 

time. She has a history of gestational hypertension in pregnancy with her first 

baby and has been taking a once daily 81mg ASA for prevention since 12wks 

gestation. She was noted to have elevated blood pressure at 36wks and her PIH 

labs at that time were WNL. Since then her blood pressure has remained WNL both 

in the office and with her at home blood pressures. Her pregnancy has otherwise 

remained uncomplicated. 


She will be admitted to [facility] for expectant management. She is supported by her 

 Yovani today.





LMP: 


Initial U/S @ 10.5wks c/w LMP dating


Serial exams - agree





OB/Gyn History: Term NSVB x 1.  SAB x 0.  Last pap 10/2022 WNL.  Denies history 

of gonorrhea, chlamydia, genital herpes, oral herpes or any other STI.  Sexual 

partner does NOT have HSV (oral or genital).





Medical Hx: no significant





Surgical Hx: none


 


Social Hx: Monogamous with male partner Yovani. Stopped drinking alcohol due to 

pregnancy. Denies current use of tobacco, marijuana or other recreational drugs.

Reports that she is safe in current relationship.





Family Hx:  Denies family history of congenital anomalies, Cystic Fibrosis or 

chromosomal abnormalities. FOB has a family history of twins.


 


Allergies: Amoxicillin


Medications: PNV, 81mg ASA once daily, FeSO4 bid





Prenatal course:


O positive, antibody negative


Rubella immune; varicella immune


HIV non-reactive; RPR  non-reactive


Hep B neg, Hep C neg


Initial U/S @ 10.5wks gestation c/w LMP dating


Genetic screening - negative


FAS WNL with the exception of incomplete visualization of fetal cardiac outflow 

tracts. Posterior placenta, no previa. Size c/w dating (EFW 76%tile). 3VC.


Completion FAS WNL.


Glucola 95


COVID-19 vaccine - x 2 with booster x 1


Tdap - administered in the 3rd trimester


GBS negative





Physical exam:


Normocephalic, atraumatic


Heart RRR w/o M/G/R


Lungs CTAB


Abdomen gravid, soft, nontender


EFW 3700g


FHR baseline 150s, moderate variability, + accels, no decels


Contractions palpate strong every 2-3 minutes with soft resting tone


SVE 8/100/0 and vertex with intact membranes


Bilateral LE's trace edema.


Mood is good.





Assessment:


39yo  @ 39.5wks gestation by LMP c/w 10.5wk U/S


Active labor


FHR Category I


GBS negative


Advanced maternal age





Plan:


Admit to [facility] for expectant management.


Continuous fetal monitoring.


Nitrous oxide PRN. Jacuzzi PRN.


Epidural per maternal request.


Anticipate .

## 2023-09-01 VITALS — DIASTOLIC BLOOD PRESSURE: 76 MMHG | SYSTOLIC BLOOD PRESSURE: 124 MMHG

## 2023-09-01 RX ADMIN — IBUPROFEN SCH MG: 800 TABLET, FILM COATED ORAL at 12:41

## 2023-09-01 RX ADMIN — ACETAMINOPHEN SCH MG: 500 TABLET ORAL at 16:44

## 2023-09-01 RX ADMIN — ACETAMINOPHEN SCH MG: 500 TABLET ORAL at 01:17

## 2023-09-01 RX ADMIN — DOCUSATE SODIUM SCH MG: 100 CAPSULE, LIQUID FILLED ORAL at 08:50

## 2023-09-01 RX ADMIN — IBUPROFEN SCH MG: 800 TABLET, FILM COATED ORAL at 05:28

## 2023-09-01 RX ADMIN — ACETAMINOPHEN SCH MG: 500 TABLET ORAL at 08:49

## 2023-09-01 NOTE — LABOR FLOWSHEET
===================================

Labor Flowsheet

===================================

Datetime Report Generated by CPN: 09/01/2023 17:49

   

   

===========================

Datetime: 09/01/2023 12:44

===========================

   

   

===================================

VITAL SIGNS

===================================

   

 NBP Sys/Gabbie/Mean (mmHg):  124

:  76

:  86

 Pulse:  81

 LaborFlag:  Labor

   

===========================

Datetime: 08/31/2023 18:18

===========================

   

 Membranes Ruptured Date/Time:  08/31/2023 14:32

   

===========================

Datetime: 08/31/2023 15:20

===========================

   

 SpO2 (%):  100

   

===========================

Datetime: 08/31/2023 14:37

===========================

   

   

===================================

STAGE 2

===================================

   

 Pushing Position:  Pushing with Contractions

   

===========================

Datetime: 08/31/2023 14:32

===========================

   

   

===================================

VAGINAL EXAM

===================================

   

 Membrane Status:  Ruptured

 Membranes Rupture Method:  Spontaneous

 Amniotic Fluid Color:  Clear

 Amniotic Fluid Amount:  Small

 Amniotic Fluid Odor:  Normal

   

===========================

Datetime: 08/31/2023 14:17

===========================

   

   

===================================

PATIENT CARE

===================================

   

 Patient Care Comments:  breathing through contractions, provider bedside

   

===========================

Datetime: 08/31/2023 13:52

===========================

   

   

===================================

COMMUNICATION

===================================

   

 Communication:  Provider at Bedside

   

===========================

Datetime: 08/31/2023 13:30

===========================

   

Stage of Pregnancy:  Labor

## 2023-09-01 NOTE — DISCHARGE PLAN
Discharge Plan


Problem Reviewed?: Yes


Disposition: 01 Home, Self Care


Condition: Good


Diet: Regular


Activity Restrictions: No Restrictions


Shower Restrictions: No


Driving Restrictions: No


Weight Bearing: Full Weight


Instruction Topics:  Birth Vaginal After


No Smoking: If you smoke, Please STOP!  Call [phone] for help.


Follow-up with: 


Cheri Angel CNM, ARNP [Provider Admit Priv/Credential] - 1 Week

## 2023-09-01 NOTE — DISCHARGE SUMMARY
Discharge Summary


Condition at Discharge: Good


Discharge Disposition: 01 Home, Self Care





- HOSPITAL COURSE


Hospital Course: 





Date of Admission: 2023





Date of Discharge: 2023





Diagnosis on Admission:


1. 37yo  @ 39.5wks gestation by LMP c/w 10.5wk U/S


2. Active labor


3. FHR Category I


4. GBS negative


5. Advanced maternal age





Diagnosis on Discharge:


1. 37yo  PPD#1 s/p TSVB viable female infant


2. Breastfeeding


3. Normal recovery





Brief History:


She is a patient of [facility] who presented on 2023 in 

active labor. Cervix was 8/100/0 and vertex with intact membranes. She 

spontaneously progressed to deliver a viable female infant on 2023 @ 1443.

1st degree perineal laceration was repaired using a 2-0 vicryl on a CT-1 needle 

in standard fashion and under sterile conditions. Apgars were 8/9 at 1 and 5 

minutes respectively. EBL 250mL.


She has been doing well in her postpartum course. She is ambulating and 

tolerating a regular diet. She is urinating without difficulty and her lochia is

normal. Her pain is well controlled with oral medications. She is bonding well 

with her baby and breastfeeding without difficulty. She will be discharged home 

today on postpartum day #1 with instructions to continue taking her prenatal vi

tamin while breastfeeding and to continue taking ibuprofen and tylenol over the 

counter as needed for pain management. She intends to follow up with myself at 

[facility] in 1 week for routine postpartum visit or sooner if 

needed. She has been given precautions to call if she has any worsening fevers, 

chills, abdominal pain, increased vaginal bleeding or foul smelling vaginal 

lochia.








Physical exam:


Normocephalic, atraumatic. Heart RRR w/o M/G/R, lungs CTAB, abdomen soft and 

nontender with fundus firm at U. Perineum intact, light lochia rubra, bilateral 

LE's no edema. Mood is good.








- ALLERGIES


Allergies/Adverse Reactions: 


                                    Allergies











Allergy/AdvReac Type Severity Reaction Status Date / Time


 


amoxicillin Allergy  Anaphylaxis Verified 21 05:43